# Patient Record
Sex: FEMALE
[De-identification: names, ages, dates, MRNs, and addresses within clinical notes are randomized per-mention and may not be internally consistent; named-entity substitution may affect disease eponyms.]

---

## 2024-07-19 ENCOUNTER — APPOINTMENT (OUTPATIENT)
Dept: ORTHOPEDIC SURGERY | Facility: CLINIC | Age: 49
End: 2024-07-19

## 2024-07-28 ENCOUNTER — NON-APPOINTMENT (OUTPATIENT)
Age: 49
End: 2024-07-28

## 2024-07-29 ENCOUNTER — NON-APPOINTMENT (OUTPATIENT)
Age: 49
End: 2024-07-29

## 2024-07-29 ENCOUNTER — APPOINTMENT (OUTPATIENT)
Dept: ORTHOPEDIC SURGERY | Facility: CLINIC | Age: 49
End: 2024-07-29
Payer: COMMERCIAL

## 2024-07-29 VITALS — BODY MASS INDEX: 28.12 KG/M2 | HEIGHT: 66 IN | WEIGHT: 175 LBS

## 2024-07-29 DIAGNOSIS — M54.6 PAIN IN THORACIC SPINE: ICD-10-CM

## 2024-07-29 DIAGNOSIS — M19.049 PRIMARY OSTEOARTHRITIS, UNSPECIFIED HAND: ICD-10-CM

## 2024-07-29 DIAGNOSIS — M47.814 SPONDYLOSIS W/OUT MYELOPATHY OR RADICULOPATHY, THORACIC REGION: ICD-10-CM

## 2024-07-29 DIAGNOSIS — M18.0 BILATERAL PRIMARY OSTEOARTHRITIS OF FIRST CARPOMETACARPAL JOINTS: ICD-10-CM

## 2024-07-29 DIAGNOSIS — M54.12 RADICULOPATHY, CERVICAL REGION: ICD-10-CM

## 2024-07-29 PROBLEM — Z00.00 ENCOUNTER FOR PREVENTIVE HEALTH EXAMINATION: Status: ACTIVE | Noted: 2024-07-29

## 2024-07-29 PROCEDURE — 72040 X-RAY EXAM NECK SPINE 2-3 VW: CPT

## 2024-07-29 PROCEDURE — 99204 OFFICE O/P NEW MOD 45 MIN: CPT

## 2024-07-29 PROCEDURE — 72100 X-RAY EXAM L-S SPINE 2/3 VWS: CPT

## 2024-07-29 PROCEDURE — 73130 X-RAY EXAM OF HAND: CPT | Mod: 50

## 2024-07-30 PROBLEM — M47.814 THORACIC ARTHRITIS: Status: ACTIVE | Noted: 2024-07-30

## 2024-07-30 PROBLEM — M54.12 CERVICAL RADICULOPATHY, CHRONIC: Status: ACTIVE | Noted: 2024-07-29

## 2024-07-30 PROBLEM — M18.0 ARTHRITIS OF CARPOMETACARPAL (CMC) JOINT OF BOTH THUMBS: Status: ACTIVE | Noted: 2024-07-29

## 2024-07-30 PROBLEM — M54.6 THORACIC SPINE PAIN: Noted: 2024-07-29

## 2024-07-30 PROBLEM — M19.049 CMC ARTHRITIS: Noted: 2024-07-29

## 2024-07-30 NOTE — PHYSICAL EXAM
[de-identified] : Constitutional: Well-nourished, well-developed, No acute distress Respiratory:  Good respiratory effort, no SOB Psychiatric: Pleasant and normal affect, alert and oriented x3 Musculoskeletal: normal except where as noted in regional exam  Cervical Spine Exam APPEARANCE: no marked deformities or malalignment, normal curvature, good posture POSITIVE TENDERNESS: + Right upper trapezius, levator scapula, rhomboid major, and rhomboid minor, + spasm noted in the same muscles. NONTENDER: no bony midline tenderness. ROM: limited in all planes, most notably in flexion and sidebending due to pain RESISTIVE TESTING: painful 4/5 resisted ext, bilateral sidebending, rotation and shoulder shrug , 5/5 flexion  SPECIAL TESTS: neg Spurling's b/l Vasc: 2+ radial pulse b/l Neuro: C5 - T1 intact to motor, DTRs 2+/4 biceps, triceps, brachioradialis Sensation: Intact to light touch throughout b/l UE  Thoracic Spine Exam:  normal curvature and normal alignment. good posture. no midline bony tenderness, + marked spasm and associated tenderness of Right paraspinal and periscapular muscles.  ROM mildly limited in sidebending and rotation due to noted spasm  Right shoulder: APPEARANCE: no marked deformities, no swelling or malalignment POSITIVE TENDERNESS: supraspinatus NONTENDER:  infraspinatus, teres minor. biceps. anterior and posterior capsule. AC joint.  ROM: full with mild painful arc past 60 degrees, no scapular winging or dyskinesia present RESISTIVE TESTING: MMT 4+/5 ER and empty can, 5/5 IR. painless 5/5 resisted flex/ext, horizontal abd/add  SPECIAL TESTS: + Puente and Neers, mildly + cross arm adduction, neg Speeds, neg Reyes's, neg Drop Arm, neg Apprehension. neg apley's scratch test  Bilateral hands: APPEARANCE: No swelling, no marked deformities or malalignment of the fingers POSITIVE TENDERNESS: + 1st CMC joint NONTENDER: all other osseous and ligamentous structures.  ROM: + Crepitus and mild limitation of the first CMC joint, otherwise full & painless in CMC, MCP, and IP joints.  RESISTIVE TESTING: painless resisted testing.   [de-identified] : The following radiographs were ordered and read by me during this patient's visit. I reviewed each radiograph in detail with the patient and discussed the findings as highlighted below.   2 views of the cervical spine were obtained today that show degenerative changes and evidence of moderate C5-C6 osteoarthritis.  No fracture, or dislocation seen at this time. There is no malalignment. No other obvious osseous abnormality. Otherwise unremarkable.  2 views of the thoracic spine were obtained today that show degenerative changes and evidence of moderate T4-T10 osteoarthritis.  No fracture, or dislocation seen at this time. There is no malalignment. No other obvious osseous abnormality. Otherwise unremarkable.  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  No fracture, or dislocation seen at this time. There is no malalignment. No other obvious osseous abnormality. Otherwise unremarkable.

## 2024-07-30 NOTE — HISTORY OF PRESENT ILLNESS
[de-identified] : 49 RHD F presents with right sided trapezius and right sided trunk numness, tingling and burning sensation She also has pain in righ tpectoral region, back and shoulder. She reports pain that used to radiate down her entire right arm, this resolved with repositing of her desk, however now she reports bilateral pain in her thumb CMC joint  She has had workup by rheumatologist for body pain which she states was negative, otherwise she has not had any other workup for this. She recently stopped eating gluten and notes that for a period she felt significantly better.

## 2024-07-30 NOTE — PHYSICAL EXAM
[de-identified] : Constitutional: Well-nourished, well-developed, No acute distress Respiratory:  Good respiratory effort, no SOB Psychiatric: Pleasant and normal affect, alert and oriented x3 Musculoskeletal: normal except where as noted in regional exam  Cervical Spine Exam APPEARANCE: no marked deformities or malalignment, normal curvature, good posture POSITIVE TENDERNESS: + Right upper trapezius, levator scapula, rhomboid major, and rhomboid minor, + spasm noted in the same muscles. NONTENDER: no bony midline tenderness. ROM: limited in all planes, most notably in flexion and sidebending due to pain RESISTIVE TESTING: painful 4/5 resisted ext, bilateral sidebending, rotation and shoulder shrug , 5/5 flexion  SPECIAL TESTS: neg Spurling's b/l Vasc: 2+ radial pulse b/l Neuro: C5 - T1 intact to motor, DTRs 2+/4 biceps, triceps, brachioradialis Sensation: Intact to light touch throughout b/l UE  Thoracic Spine Exam:  normal curvature and normal alignment. good posture. no midline bony tenderness, + marked spasm and associated tenderness of Right paraspinal and periscapular muscles.  ROM mildly limited in sidebending and rotation due to noted spasm  Right shoulder: APPEARANCE: no marked deformities, no swelling or malalignment POSITIVE TENDERNESS: supraspinatus NONTENDER:  infraspinatus, teres minor. biceps. anterior and posterior capsule. AC joint.  ROM: full with mild painful arc past 60 degrees, no scapular winging or dyskinesia present RESISTIVE TESTING: MMT 4+/5 ER and empty can, 5/5 IR. painless 5/5 resisted flex/ext, horizontal abd/add  SPECIAL TESTS: + Puente and Neers, mildly + cross arm adduction, neg Speeds, neg Reyes's, neg Drop Arm, neg Apprehension. neg apley's scratch test  Bilateral hands: APPEARANCE: No swelling, no marked deformities or malalignment of the fingers POSITIVE TENDERNESS: + 1st CMC joint NONTENDER: all other osseous and ligamentous structures.  ROM: + Crepitus and mild limitation of the first CMC joint, otherwise full & painless in CMC, MCP, and IP joints.  RESISTIVE TESTING: painless resisted testing.   [de-identified] : The following radiographs were ordered and read by me during this patient's visit. I reviewed each radiograph in detail with the patient and discussed the findings as highlighted below.   2 views of the cervical spine were obtained today that show degenerative changes and evidence of moderate C5-C6 osteoarthritis.  No fracture, or dislocation seen at this time. There is no malalignment. No other obvious osseous abnormality. Otherwise unremarkable.  2 views of the thoracic spine were obtained today that show degenerative changes and evidence of moderate T4-T10 osteoarthritis.  No fracture, or dislocation seen at this time. There is no malalignment. No other obvious osseous abnormality. Otherwise unremarkable.  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  No fracture, or dislocation seen at this time. There is no malalignment. No other obvious osseous abnormality. Otherwise unremarkable.

## 2024-07-30 NOTE — HISTORY OF PRESENT ILLNESS
[de-identified] : 49 RHD F presents with right sided trapezius and right sided trunk numness, tingling and burning sensation She also has pain in righ tpectoral region, back and shoulder. She reports pain that used to radiate down her entire right arm, this resolved with repositing of her desk, however now she reports bilateral pain in her thumb CMC joint  She has had workup by rheumatologist for body pain which she states was negative, otherwise she has not had any other workup for this. She recently stopped eating gluten and notes that for a period she felt significantly better.

## 2024-07-30 NOTE — DISCUSSION/SUMMARY
[de-identified] : Discussed findings of today's exam and possible causes of patient's pain.  Patient has an atypical myriad of symptoms involving the right side of the neck, right shoulder, right chest and periscapular area as well as right ribs and right upper extremity pain.  She has features of right subacromial impingement, but her symptoms extend well beyond what would be clinically expected from this pathology.  Patient has pain throughout the neck and into the right upper extremity that could be due to cervical radiculopathy, she is also having vague poorly described pain about the right side of the periscapular back area and right side of the ribs, this could be thoracic radiculopathy in etiology.  On x-ray today patient has noted osteoarthritis within the cervical spine in the thoracic spine with noted kyphosis of the thoracic spine.  This also increases the likelihood of degenerative disc disease and possible radicular pain coming from thoracic nerve compression.  Reviewed possible courses of treatment, and we collaboratively decided best course of treatment at this time will include conservative management.  Patient cannot take oral NSAIDs due to allergy to ibuprofen.  Patient may continue take Tylenol as needed for pain relief.  Recommended trial of over-the-counter natural anti-inflammatory supplement of turmeric/curcumin patient will be started on a course of physical therapy to restore normal range of motion and strength as tolerated.  If patient has persistent pain may consider MRI of the cervical and/or thoracic spines at that time. 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  She is having right more symptomatic than left CMC arthritis.  Patient started on a course of topical NSAIDs, prescription given for diclofenac gel 1% to be applied to the area of pain 2-3 times daily as needed (We discussed all possible side effects of this medication).  Patient will continue utilization of thumb spica brace to be worn in the evening and as tolerated during the day for support.  Patient will be started on a course of hand therapy at this time to restore normal range of motion, strength and overall function.  If patient has persisting pain may consider cortisone injections at that time. Follow up as needed.  Patient appreciates and agrees with current plan.  This note was generated using dragon medical dictation software.  A reasonable effort has been made for proofreading its contents, but typos may still remain.  If there are any questions or points of clarification needed please notify my office.

## 2024-07-30 NOTE — DISCUSSION/SUMMARY
[de-identified] : Discussed findings of today's exam and possible causes of patient's pain.  Patient has an atypical myriad of symptoms involving the right side of the neck, right shoulder, right chest and periscapular area as well as right ribs and right upper extremity pain.  She has features of right subacromial impingement, but her symptoms extend well beyond what would be clinically expected from this pathology.  Patient has pain throughout the neck and into the right upper extremity that could be due to cervical radiculopathy, she is also having vague poorly described pain about the right side of the periscapular back area and right side of the ribs, this could be thoracic radiculopathy in etiology.  On x-ray today patient has noted osteoarthritis within the cervical spine in the thoracic spine with noted kyphosis of the thoracic spine.  This also increases the likelihood of degenerative disc disease and possible radicular pain coming from thoracic nerve compression.  Reviewed possible courses of treatment, and we collaboratively decided best course of treatment at this time will include conservative management.  Patient cannot take oral NSAIDs due to allergy to ibuprofen.  Patient may continue take Tylenol as needed for pain relief.  Recommended trial of over-the-counter natural anti-inflammatory supplement of turmeric/curcumin patient will be started on a course of physical therapy to restore normal range of motion and strength as tolerated.  If patient has persistent pain may consider MRI of the cervical and/or thoracic spines at that time. 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  She is having right more symptomatic than left CMC arthritis.  Patient started on a course of topical NSAIDs, prescription given for diclofenac gel 1% to be applied to the area of pain 2-3 times daily as needed (We discussed all possible side effects of this medication).  Patient will continue utilization of thumb spica brace to be worn in the evening and as tolerated during the day for support.  Patient will be started on a course of hand therapy at this time to restore normal range of motion, strength and overall function.  If patient has persisting pain may consider cortisone injections at that time. Follow up as needed.  Patient appreciates and agrees with current plan.  This note was generated using dragon medical dictation software.  A reasonable effort has been made for proofreading its contents, but typos may still remain.  If there are any questions or points of clarification needed please notify my office.